# Patient Record
Sex: MALE | Race: WHITE | Employment: UNEMPLOYED | URBAN - METROPOLITAN AREA
[De-identification: names, ages, dates, MRNs, and addresses within clinical notes are randomized per-mention and may not be internally consistent; named-entity substitution may affect disease eponyms.]

---

## 2017-11-24 NOTE — PROGRESS NOTES
SUBJECTIVE:   Tad Beltre is a 22 year old male who presents to clinic today for the following health issues:      Depression and Anxiety Follow-Up    Status since last visit: Worsened     Other associated symptoms:None    Complicating factors:     Significant life event: No     Current substance abuse: Prescription Drugs    Has been off his meds x1 week  Having worsening mood and nerve pain  Would like to restart everything  Remains sober - has had some alcohol since discharge, but nothing else  Working with the [employer], likes his job  Living with mother currently        PHQ-9 Score and MyChart F/U Questions 11/27/2017   Total Score 15   Q9: Suicide Ideation Not at all     PRESTON-7 SCORE 11/27/2017   Total Score 14       PHQ-9  English  PHQ-9   Any Language  GAD7  Suicide Assessment Five-step Evaluation and Treatment (SAFE-T)      Amount of exercise or physical activity: 2-3 days/week for an average of 30-45 minutes    Problems taking medications regularly: No    Medication side effects: none    Diet: regular (no restrictions)        Problem list and histories reviewed & adjusted, as indicated.  Additional history: as documented    Patient Active Problem List   Diagnosis     Opioid type dependence (H)     Cannabis dependence (H)     Non-traumatic rhabdomyolysis     Neuropathy     Moderate single current episode of major depressive disorder (H)     Essential hypertension     Anxiety     Tobacco use disorder     History reviewed. No pertinent surgical history.    Social History   Substance Use Topics     Smoking status: Current Every Day Smoker     Packs/day: 1.00     Smokeless tobacco: Never Used     Alcohol use Yes     Family History   Problem Relation Age of Onset     Anxiety Disorder Mother      Depression Mother      Depression Maternal Grandmother              Reviewed and updated as needed this visit by clinical staffTobacco  Allergies  Meds  Med Hx  Surg Hx  Fam Hx  Soc Hx      Reviewed and  "updated as needed this visit by Provider         ROS:  Constitutional, neuro, CV, musculoskeletal, and psychiatric systems are negative, except as otherwise noted.      OBJECTIVE:                                                    /88  Pulse 88  Temp 98.3  F (36.8  C) (Oral)  Ht 5' 9\" (1.753 m)  Wt 170 lb 6 oz (77.3 kg)  SpO2 100%  BMI 25.16 kg/m2  Body mass index is 25.16 kg/(m^2).  Constitutional: healthy, alert, active, no distress.    Head: Normocephalic  Musculoskeletal: extremities normal- no gross deformities noted, gait normal, normal muscle tone and able to move about the exam room without difficulty.    Skin: no suspicious lesions or rashes appreciated on exposed areas  Neurologic: Gait normal. Moving all extremities spontaneously, no apparent weakness.    Psychiatric: mentation appears normal, thoughts are clear and concise. Converses appropriately. Affect is Blunted/Flat       ASSESSMENT:                                                      1. Neuropathy    2. Non-traumatic rhabdomyolysis    3. Moderate single current episode of major depressive disorder (H)    4. Essential hypertension    5. Anxiety    6. Tobacco use disorder    7. Opioid dependence in remission (H)         PLAN:                                                    Care Everywhere reviewed. Meds renewed, no changes. Reports he was doing well prior to stopping his meds 1 week ago. Follow up in 3 months to discuss discontinuing gabapentin and Robaxin.    Patient Instructions   Follow up with me in 3 months.       The patient was in agreement with the plan today and had no questions or concerns prior to leaving the clinic.     Loree Baires PA-C  [facility]  "

## 2017-11-27 ENCOUNTER — OFFICE VISIT (OUTPATIENT)
Dept: FAMILY MEDICINE | Facility: CLINIC | Age: 22
End: 2017-11-27
Payer: COMMERCIAL

## 2017-11-27 VITALS
SYSTOLIC BLOOD PRESSURE: 135 MMHG | TEMPERATURE: 98.3 F | BODY MASS INDEX: 25.23 KG/M2 | WEIGHT: 170.38 LBS | HEIGHT: 69 IN | OXYGEN SATURATION: 100 % | HEART RATE: 88 BPM | DIASTOLIC BLOOD PRESSURE: 88 MMHG

## 2017-11-27 DIAGNOSIS — G62.9 NEUROPATHY: Primary | ICD-10-CM

## 2017-11-27 DIAGNOSIS — I10 ESSENTIAL HYPERTENSION: ICD-10-CM

## 2017-11-27 DIAGNOSIS — F41.9 ANXIETY: ICD-10-CM

## 2017-11-27 DIAGNOSIS — F17.200 TOBACCO USE DISORDER: ICD-10-CM

## 2017-11-27 DIAGNOSIS — M62.82 NON-TRAUMATIC RHABDOMYOLYSIS: ICD-10-CM

## 2017-11-27 DIAGNOSIS — F11.21 OPIOID DEPENDENCE IN REMISSION (H): ICD-10-CM

## 2017-11-27 DIAGNOSIS — F32.1 MODERATE SINGLE CURRENT EPISODE OF MAJOR DEPRESSIVE DISORDER (H): ICD-10-CM

## 2017-11-27 PROCEDURE — 99204 OFFICE O/P NEW MOD 45 MIN: CPT | Performed by: PHYSICIAN ASSISTANT

## 2017-11-27 RX ORDER — THIAMINE HCL 50 MG
50 TABLET ORAL DAILY
Qty: 30 TABLET | Refills: 2 | Status: SHIPPED | OUTPATIENT
Start: 2017-11-27

## 2017-11-27 RX ORDER — MIRTAZAPINE 7.5 MG/1
7.5 TABLET, FILM COATED ORAL AT BEDTIME
Qty: 30 TABLET | Refills: 2 | Status: SHIPPED | OUTPATIENT
Start: 2017-11-27 | End: 2018-03-28

## 2017-11-27 RX ORDER — METHOCARBAMOL 500 MG/1
500-1000 TABLET, FILM COATED ORAL 3 TIMES DAILY PRN
Qty: 60 TABLET | Refills: 2 | Status: SHIPPED | OUTPATIENT
Start: 2017-11-27

## 2017-11-27 RX ORDER — METOPROLOL TARTRATE 50 MG
75 TABLET ORAL 2 TIMES DAILY
Qty: 90 TABLET | Refills: 2 | Status: SHIPPED | OUTPATIENT
Start: 2017-11-27 | End: 2018-03-28

## 2017-11-27 RX ORDER — DULOXETIN HYDROCHLORIDE 30 MG/1
90 CAPSULE, DELAYED RELEASE ORAL DAILY
Qty: 90 CAPSULE | Refills: 2 | Status: SHIPPED | OUTPATIENT
Start: 2017-11-27 | End: 2018-03-28

## 2017-11-27 RX ORDER — QUETIAPINE FUMARATE 100 MG/1
100 TABLET, FILM COATED ORAL AT BEDTIME
Qty: 30 TABLET | Refills: 2 | Status: SHIPPED | OUTPATIENT
Start: 2017-11-27 | End: 2018-03-28

## 2017-11-27 RX ORDER — HYDROXYZINE HYDROCHLORIDE 25 MG/1
25-50 TABLET, FILM COATED ORAL 2 TIMES DAILY PRN
Qty: 60 TABLET | Refills: 2 | Status: SHIPPED | OUTPATIENT
Start: 2017-11-27

## 2017-11-27 RX ORDER — GABAPENTIN 300 MG/1
900 CAPSULE ORAL 3 TIMES DAILY
Qty: 270 CAPSULE | Refills: 2 | Status: SHIPPED | OUTPATIENT
Start: 2017-11-27 | End: 2018-03-28

## 2017-11-27 RX ORDER — NICOTINE 21 MG/24HR
1 PATCH, TRANSDERMAL 24 HOURS TRANSDERMAL EVERY 24 HOURS
Qty: 30 PATCH | Refills: 0 | Status: SHIPPED | OUTPATIENT
Start: 2017-11-27

## 2017-11-27 ASSESSMENT — ANXIETY QUESTIONNAIRES
6. BECOMING EASILY ANNOYED OR IRRITABLE: MORE THAN HALF THE DAYS
7. FEELING AFRAID AS IF SOMETHING AWFUL MIGHT HAPPEN: MORE THAN HALF THE DAYS
GAD7 TOTAL SCORE: 14
2. NOT BEING ABLE TO STOP OR CONTROL WORRYING: MORE THAN HALF THE DAYS
1. FEELING NERVOUS, ANXIOUS, OR ON EDGE: MORE THAN HALF THE DAYS
3. WORRYING TOO MUCH ABOUT DIFFERENT THINGS: MORE THAN HALF THE DAYS
IF YOU CHECKED OFF ANY PROBLEMS ON THIS QUESTIONNAIRE, HOW DIFFICULT HAVE THESE PROBLEMS MADE IT FOR YOU TO DO YOUR WORK, TAKE CARE OF THINGS AT HOME, OR GET ALONG WITH OTHER PEOPLE: VERY DIFFICULT
5. BEING SO RESTLESS THAT IT IS HARD TO SIT STILL: NEARLY EVERY DAY

## 2017-11-27 ASSESSMENT — PATIENT HEALTH QUESTIONNAIRE - PHQ9
5. POOR APPETITE OR OVEREATING: SEVERAL DAYS
SUM OF ALL RESPONSES TO PHQ QUESTIONS 1-9: 15

## 2017-11-27 NOTE — PROGRESS NOTES
Meds Per Care Everywhere    CURRENT MEDICATIONS:   Current Outpatient Prescriptions   Medication Sig Dispense Refill     acetaminophen (TYLENOL EXTRA STRGTH) 500 mg tablet Take 2 tablets by mouth 4 times daily. Max acetaminophen dose: 4000mg in 24 hrs. 0     Cane Single Point Cane for home use. For 99 days. 1 Device 0     DULoxetine (CYMBALTA) 30 mg Delayed-release capsule Take 3 capsules by mouth once daily for 30 days. 90 capsule 0     gabapentin (NEURONTIN) 300 mg capsule Take 3 capsules by mouth 3 times daily for 30 days. 270 capsule 0     hydrOXYzine pamoate (VISTARIL) 25 mg capsule Take 1 capsule by mouth 2 times daily if needed for anxiety, itching, or pain (use with dilaudid). 30 capsule 0     lidocaine 5% (LIDODERM) 5 % patch Apply 1-3 Patches on dry, clean, hairless skin once daily. Apply patch(es) to painful area of skin for up to 12 hours within a 24-hour period. 0     methocarbamol (ROBAXIN) 500 mg tablet Take 1-2 tablets by mouth 4 times daily for 30 days. 240 tablet 0     metoprolol tartrate (LOPRESSOR) 25 mg tablet Take 3 tablets by mouth 2 times daily for 30 days. 180 tablet 0     mirtazapine (REMERON) 7.5 mg tablet Take 1 tablet by mouth at bedtime for 30 days. 30 tablet 0     multivitamin-folic acid 0.4 mg tablet Take 1 tablet by mouth once daily for 30 days. 30 tablet 0     nicotine (NICORETTE) 2 mg gum Take 1 Each by mouth every hour while awake as needed for Nicotine Craving. 110 Each 0     nicotine 14 mg/24 hr (NICODERM; HABITROL) 14 mg/24 hr patch Apply 1 Patch on dry, clean, hairless skin once daily 28 Patch 0     QUEtiapine (SEROQUEL) 100 mg tablet Take 1 tablet by mouth at bedtime. 30 tablet 0     thiamine (VITAMIN B1) 100 mg tablet Take 1 tablet by mouth once daily for 30 days. 30 tablet 0

## 2017-11-27 NOTE — MR AVS SNAPSHOT
"              After Visit Summary   2017    Tad Beltre    MRN: [id number]           Patient Information     Date Of Birth          1995        Visit Information        Provider Department      2017 5:20 PM Loree Baires PA-C [facility] [place] Instructions    Follow up with me in 3 months.          Follow-ups after your visit        Who to contact     Normal or non-critical lab and imaging results will be communicated to you by LINYWORKShart, letter or phone within 4 business days after the clinic has received the results. If you do not hear from us within 7 days, please contact the clinic through MyChart or phone. If you have a critical or abnormal lab result, we will notify you by phone as soon as possible.  Submit refill requests through [email or web address] or call your pharmacy and they will forward the refill request to us. Please allow 3 business days for your refill to be completed.          If you need to speak with a  for additional information , please call: [phone]             Additional Information About Your Visit        [email or web address] Information     [email or web address] lets you send messages to your doctor, view your test results, renew your prescriptions, schedule appointments and more. To sign up, go to [email or web address] . Click on \"Log in\" on the left side of the screen, which will take you to the Welcome page. Then click on \"Sign up Now\" on the right side of the page.     You will be asked to enter the access code listed below, as well as some personal information. Please follow the directions to create your username and password.     Your access code is: [email or web address]  Expires: 2018  5:51 PM     Your access code will  in 90 days. If you need help or a new code, please call your [place] clinic or [phone].        Care EveryWhere ID     This is your Care EveryWhere ID. This could be used by other organizations to access your [place] medical " "records  [id number]        Your Vitals Were     Pulse Temperature Height Pulse Oximetry BMI (Body Mass Index)       88 98.3  F (36.8  C) (Oral) 5' 9\" (1.753 m) 100% 25.16 kg/m2        Blood Pressure from Last 3 Encounters:   11/27/17 135/88   10/31/12 136/80    Weight from Last 3 Encounters:   11/27/17 170 lb 6 oz (77.3 kg)   10/31/12 147 lb (66.7 kg) (54 %)*     * Growth percentiles are based on [facility] 2-20 Years data.              Today, you had the following     No orders found for display       Primary Care Provider Office Phone # Fax #    Jonathan Mathew [phone]       [facility] [address] [address]        Equal Access to Services     [facility]: Hadii aad ku hadasho Soomaali, waaxda luqadaha, qaybta kaalmada adeegyada, liliana cheemain hayaan marcie franco . So [facility] [phone].    ATENCIÓN: Si habla español, tiene a sheriff disposición servicios gratuitos de asistencia lingüística. Llame al [phone].    We comply with applicable federal civil rights laws and [place] laws. We do not discriminate on the basis of race, color, national origin, age, disability, sex, sexual orientation, or gender identity.            Thank you!     Thank you for choosing [facility]  for your care. Our goal is always to provide you with excellent care. Hearing back from our patients is one way we can continue to improve our services. Please take a few minutes to complete the written survey that you may receive in the mail after your visit with us. Thank you!             Your Updated Medication List - Protect others around you: Learn how to safely use, store and throw away your medicines at www.disposemymeds.org.          This list is accurate as of: 11/27/17  5:51 PM.  Always use your most recent med list.                   Brand Name Dispense Instructions for use Diagnosis    melatonin 3 MG tablet     30 tablet    Take 1 tablet by mouth nightly as " needed for sleep.    Persistent disorder of initiating or maintaining sleep       SKIN TEST READING TUBERCULIN      every 24 hours.    Routine health maintenance

## 2017-11-27 NOTE — NURSING NOTE
"Chief Complaint   Patient presents with     Recheck Medication       Initial /88  Pulse 88  Temp 98.3  F (36.8  C) (Oral)  Ht 5' 9\" (1.753 m)  Wt 170 lb 6 oz (77.3 kg)  SpO2 100%  BMI 25.16 kg/m2 Estimated body mass index is 25.16 kg/(m^2) as calculated from the following:    Height as of this encounter: 5' 9\" (1.753 m).    Weight as of this encounter: 170 lb 6 oz (77.3 kg).  Medication Reconciliation: complete   Digna Zepeda MA      "

## 2017-11-28 ASSESSMENT — ANXIETY QUESTIONNAIRES: GAD7 TOTAL SCORE: 14

## 2018-03-28 ENCOUNTER — OFFICE VISIT (OUTPATIENT)
Dept: FAMILY MEDICINE | Facility: CLINIC | Age: 23
End: 2018-03-28
Payer: COMMERCIAL

## 2018-03-28 VITALS
BODY MASS INDEX: 31.43 KG/M2 | SYSTOLIC BLOOD PRESSURE: 135 MMHG | OXYGEN SATURATION: 98 % | HEART RATE: 84 BPM | TEMPERATURE: 98.6 F | WEIGHT: 212.8 LBS | DIASTOLIC BLOOD PRESSURE: 85 MMHG

## 2018-03-28 DIAGNOSIS — I10 ESSENTIAL HYPERTENSION: ICD-10-CM

## 2018-03-28 DIAGNOSIS — G62.9 NEUROPATHY: ICD-10-CM

## 2018-03-28 DIAGNOSIS — M62.82 NON-TRAUMATIC RHABDOMYOLYSIS: ICD-10-CM

## 2018-03-28 DIAGNOSIS — F32.1 MODERATE SINGLE CURRENT EPISODE OF MAJOR DEPRESSIVE DISORDER (H): ICD-10-CM

## 2018-03-28 PROCEDURE — 99214 OFFICE O/P EST MOD 30 MIN: CPT | Performed by: PHYSICIAN ASSISTANT

## 2018-03-28 RX ORDER — MIRTAZAPINE 7.5 MG/1
7.5 TABLET, FILM COATED ORAL AT BEDTIME
Qty: 90 TABLET | Refills: 3 | Status: SHIPPED | OUTPATIENT
Start: 2018-03-28

## 2018-03-28 RX ORDER — QUETIAPINE FUMARATE 100 MG/1
100 TABLET, FILM COATED ORAL AT BEDTIME
Qty: 90 TABLET | Refills: 3 | Status: SHIPPED | OUTPATIENT
Start: 2018-03-28

## 2018-03-28 RX ORDER — GABAPENTIN 300 MG/1
900 CAPSULE ORAL 3 TIMES DAILY
Qty: 810 CAPSULE | Refills: 3 | Status: SHIPPED | OUTPATIENT
Start: 2018-03-28

## 2018-03-28 RX ORDER — DULOXETIN HYDROCHLORIDE 30 MG/1
90 CAPSULE, DELAYED RELEASE ORAL DAILY
Qty: 270 CAPSULE | Refills: 3 | Status: SHIPPED | OUTPATIENT
Start: 2018-03-28

## 2018-03-28 RX ORDER — METOPROLOL TARTRATE 50 MG
75 TABLET ORAL 2 TIMES DAILY
Qty: 270 TABLET | Refills: 3 | Status: SHIPPED | OUTPATIENT
Start: 2018-03-28

## 2018-03-28 ASSESSMENT — ANXIETY QUESTIONNAIRES
IF YOU CHECKED OFF ANY PROBLEMS ON THIS QUESTIONNAIRE, HOW DIFFICULT HAVE THESE PROBLEMS MADE IT FOR YOU TO DO YOUR WORK, TAKE CARE OF THINGS AT HOME, OR GET ALONG WITH OTHER PEOPLE: NOT DIFFICULT AT ALL
GAD7 TOTAL SCORE: 3
2. NOT BEING ABLE TO STOP OR CONTROL WORRYING: NOT AT ALL
1. FEELING NERVOUS, ANXIOUS, OR ON EDGE: SEVERAL DAYS
7. FEELING AFRAID AS IF SOMETHING AWFUL MIGHT HAPPEN: NOT AT ALL
5. BEING SO RESTLESS THAT IT IS HARD TO SIT STILL: NOT AT ALL
6. BECOMING EASILY ANNOYED OR IRRITABLE: SEVERAL DAYS
3. WORRYING TOO MUCH ABOUT DIFFERENT THINGS: SEVERAL DAYS

## 2018-03-28 ASSESSMENT — PATIENT HEALTH QUESTIONNAIRE - PHQ9: 5. POOR APPETITE OR OVEREATING: NOT AT ALL

## 2018-03-28 NOTE — LETTER
My Depression Action Plan  Name: Tad Beltre   Date of Birth 1995  Date: 3/28/2018    My doctor: Loree Baires   My clinic: [facility]  [address]  [place] [id number] [phone]          GREEN    ZONE   Good Control    What it looks like:     Things are going generally well. You have normal up s and down s. You may even feel depressed from time to time, but bad moods usually last less than a day.   What you need to do:  1. Continue to care for yourself (see self care plan)  2. Check your depression survival kit and update it as needed  3. Follow your physician s recommendations including any medication.  4. Do not stop taking medication unless you consult with your physician first.           YELLOW         ZONE Getting Worse    What it looks like:     Depression is starting to interfere with your life.     It may be hard to get out of bed; you may be starting to isolate yourself from others.    Symptoms of depression are starting to last most all day and this has happened for several days.     You may have suicidal thoughts but they are not constant.   What you need to do:     1. Call your care team, your response to treatment will improve if you keep your care team informed of your progress. Yellow periods are signs an adjustment may need to be made.     2. Continue your self-care, even if you have to fake it!    3. Talk to someone in your support network    4. Open up your depression survival kit           RED    ZONE Medical Alert - Get Help    What it looks like:     Depression is seriously interfering with your life.     You may experience these or other symptoms: You can t get out of bed most days, can t work or engage in other necessary activities, you have trouble taking care of basic hygiene, or basic responsibilities, thoughts of suicide or death that will not go away, self-injurious behavior.     What you need to do:  1. Call your care  team and request a same-day appointment. If they are not available (weekends or after hours) call your local crisis line, emergency room or 911.            Depression Self Care Plan / Survival Kit    Self-Care for Depression  Here s the deal. Your body and mind are really not as separate as most people think.  What you do and think affects how you feel and how you feel influences what you do and think. This means if you do things that people who feel good do, it will help you feel better.  Sometimes this is all it takes.  There is also a place for medication and therapy depending on how severe your depression is, so be sure to consult with your medical provider and/ or Behavioral Health Consultant if your symptoms are worsening or not improving.     In order to better manage my stress, I will:    Exercise  Get some form of exercise, every day. This will help reduce pain and release endorphins, the  feel good  chemicals in your brain. This is almost as good as taking antidepressants!  This is not the same as joining a gym and then never going! (they count on that by the way ) It can be as simple as just going for a walk or doing some gardening, anything that will get you moving.      Hygiene   Maintain good hygiene (Get out of bed in the morning, Make your bed, Brush your teeth, Take a shower, and Get dressed like you were going to work, even if you are unemployed).  If your clothes don't fit try to get ones that do.    Diet  I will strive to eat foods that are good for me, drink plenty of water, and avoid excessive sugar, caffeine, alcohol, and other mood-altering substances.  Some foods that are helpful in depression are: complex carbohydrates, B vitamins, flaxseed, fish or fish oil, fresh fruits and vegetables.    Psychotherapy  I agree to participate in Individual Therapy (if recommended).    Medication  If prescribed medications, I agree to take them.  Missing doses can result in serious side effects.  I  understand that drinking alcohol, or other illicit drug use, may cause potential side effects.  I will not stop my medication abruptly without first discussing it with my provider.    Staying Connected With Others  I will stay in touch with my friends, family members, and my primary care provider/team.    Use your imagination  Be creative.  We all have a creative side; it doesn t matter if it s oil painting, sand castles, or mud pies! This will also kick up the endorphins.    Witness Beauty  (AKA stop and smell the roses) Take a look outside, even in mid-winter. Notice colors, textures. Watch the squirrels and birds.     Service to others  Be of service to others.  There is always someone else in need.  By helping others we can  get out of ourselves  and remember the really important things.  This also provides opportunities for practicing all the other parts of the program.    Humor  Laugh and be silly!  Adjust your TV habits for less news and crime-drama and more comedy.    Control your stress  Try breathing deep, massage therapy, biofeedback, and meditation. Find time to relax each day.     My support system    Clinic Contact:  Phone number:    Contact 1:  Phone number:    Contact 2:  Phone number:    Jainism/:  Phone number:    Therapist:  Phone number:    Local crisis center:    Phone number:    Other community support:  Phone number:

## 2018-03-28 NOTE — PROGRESS NOTES
SUBJECTIVE:   Tad Beltre is a 22 year old male who presents to clinic today for the following health issues:      Depression and Anxiety Follow-Up    Status since last visit: Improved     Other associated symptoms:None    Complicating factors:     Significant life event: No     Current substance abuse: None        Last visit, Nov 2017:  Has been off his meds x1 week  Having worsening mood and nerve pain  Would like to restart everything  Remains sober - has had some alcohol since discharge, but nothing else  Working with the [employer], likes his job  Living with mother currently      PHQ-9 11/27/2017   Total Score 15   Q9: Suicide Ideation Not at all     PRESTON-7 SCORE 11/27/2017   Total Score 14       PHQ-9  English  PHQ-9   Any Language  PRESTON-7  Suicide Assessment Five-step Evaluation and Treatment (SAFE-T)    Amount of exercise or physical activity: will start soon    Problems taking medications regularly: No    Medication side effects: none    Diet: regular (no restrictions)        PROBLEMS TO ADD ON...  Needs refills    Problem list and histories reviewed & adjusted, as indicated.  Additional history: as documented    Patient Active Problem List   Diagnosis     Opioid type dependence (H)     Cannabis dependence (H)     Non-traumatic rhabdomyolysis     Neuropathy     Moderate single current episode of major depressive disorder (H)     Essential hypertension     Anxiety     Tobacco use disorder     No past surgical history on file.    Social History   Substance Use Topics     Smoking status: Current Every Day Smoker     Packs/day: 1.00     Smokeless tobacco: Never Used     Alcohol use Yes     Family History   Problem Relation Age of Onset     Anxiety Disorder Mother      Depression Mother      Depression Maternal Grandmother            Reviewed and updated as needed this visit by clinical staff  Tobacco  Allergies  Meds       Reviewed and updated as needed this visit by Provider          ROS:  Constitutional, neuro, and psychiatric systems are negative, except as otherwise noted.    OBJECTIVE:                                                    /85  Pulse 84  Temp 98.6  F (37  C) (Tympanic)  Wt 212 lb 12.8 oz (96.5 kg)  SpO2 98%  BMI 31.43 kg/m2  Body mass index is 31.43 kg/(m^2).  Constitutional: healthy, alert, active, no distress.    Head: Normocephalic   Musculoskeletal: extremities normal- no gross deformities noted, gait normal, normal muscle tone and able to move about the exam room without difficulty.    Skin: no suspicious lesions or rashes appreciated on exposed areas  Neurologic: Gait normal. Moving all extremities spontaneously, no apparent weakness.    Psychiatric: mentation appears normal, thoughts are clear and concise. Converses appropriately. Affect is Appropriate/mood-congruent       ASSESSMENT:                                                      1. Essential hypertension    2. Moderate single current episode of major depressive disorder (H)    3. Neuropathy    4. Non-traumatic rhabdomyolysis         PLAN:                                                    Patient is doing well. He doesn't feel the need to make any changes to his med regimen today. He is currently going to treatment for drug abuse. Three months sober. Meds renewed, no changes. Follow up annually.    The patient was in agreement with the plan today and had no questions or concerns prior to leaving the clinic.     Loree Baires PA-C  [facility]

## 2018-03-28 NOTE — MR AVS SNAPSHOT
After Visit Summary   3/28/2018    Tad Beltre    MRN: [id number]           Patient Information     Date Of Birth          1995        Visit Information        Provider Department      3/28/2018 12:20 PM Loree Baires PA-C [facility]        Today's Diagnoses     Essential hypertension        Moderate single current episode of major depressive disorder (H)        Neuropathy        Non-traumatic rhabdomyolysis           Follow-ups after your visit        Follow-up notes from your care team     Return in about 1 year (around 3/28/2019) for a med check.      Your next 10 appointments already scheduled     Mar 28, 2018 12:20 PM CDT   Office Visit with Loree Baires PA-C   [facility] [place] [facility]    [address] [facility] [address] [phone]           Bring a current list of meds and any records pertaining to this visit. For Physicals, please bring immunization records and any forms needing to be filled out. Please arrive 10 minutes early to complete paperwork.              Who to contact     Normal or non-critical lab and imaging results will be communicated to you by [employer] Playbook[employer], letter or phone within 4 business days after the clinic has received the results. If you do not hear from us within 7 days, please contact the clinic through Sp[employer] or phone. If you have a critical or abnormal lab result, we will notify you by phone as soon as possible.  Submit refill requests through [employer] or call your pharmacy and they will forward the refill request to us. Please allow 3 business days for your refill to be completed.          If you need to speak with a  for additional information , please call: [phone]             Additional Information About Your Visit        [employer] Information     [employer] lets you send messages to your doctor, view your test results, renew your prescriptions, schedule appointments  "and more. To sign up, go to [email or web address] . Click on \"Log in\" on the left side of the screen, which will take you to the Welcome page. Then click on \"Sign up Now\" on the right side of the page.     You will be asked to enter the access code listed below, as well as some personal information. Please follow the directions to create your username and password.     Your access code is: [email or web address]  Expires: 2018  6:56 PM     Your access code will  in 90 days. If you need help or a new code, please call your [facility] clinic or [phone].        Care EveryWhere ID     This is your Care EveryWhere ID. This could be used by other organizations to access your [facility] medical records  [id number]        Your Vitals Were     Pulse Temperature Pulse Oximetry BMI (Body Mass Index)          84 98.6  F (37  C) (Tympanic) 98% 31.43 kg/m2         Blood Pressure from Last 3 Encounters:   18 135/85   17 135/88   10/31/12 136/80    Weight from Last 3 Encounters:   18 212 lb 12.8 oz (96.5 kg)   17 170 lb 6 oz (77.3 kg)   10/31/12 147 lb (66.7 kg) (54 %)*     * Growth percentiles are based on [facility] 2-20 Years data.              We Performed the Following     DEPRESSION ACTION PLAN (DAP)          Where to get your medicines      These medications were sent to [facility] - [address]  [address] [facility] [address] [address]     Phone:  [phone]     DULoxetine 30 MG EC capsule    gabapentin 300 MG capsule    metoprolol tartrate 50 MG tablet    mirtazapine 7.5 MG Tabs tablet    QUEtiapine 100 MG tablet          Primary Care Provider Office Phone # Fax #    Loree Baires PA-C [phone] [address]  [place] [address]        Equal Access to Services     [facility] SANTOS : Lissette Amin, waaxda luqadamontez lofton waxay idiin hayaan adeeg kharash la'aan ah. So wa " [phone].    ATENCIÓN: Si richar whitaker, tiene a sheriff disposición servicios gratuitos de asistencia lingüística. Murtaza zhong [phone].    We comply with applicable federal civil rights laws and [place] laws. We do not discriminate on the basis of race, color, national origin, age, disability, sex, sexual orientation, or gender identity.            Thank you!     Thank you for choosing [facility]  for your care. Our goal is always to provide you with excellent care. Hearing back from our patients is one way we can continue to improve our services. Please take a few minutes to complete the written survey that you may receive in the mail after your visit with us. Thank you!             Your Updated Medication List - Protect others around you: Learn how to safely use, store and throw away your medicines at www.disposemymeds.org.          This list is accurate as of 3/28/18 12:11 PM.  Always use your most recent med list.                   Brand Name Dispense Instructions for use Diagnosis    DULoxetine 30 MG EC capsule    CYMBALTA    270 capsule    Take 3 capsules (90 mg) by mouth daily    Moderate single current episode of major depressive disorder (H)       gabapentin 300 MG capsule    NEURONTIN    810 capsule    Take 3 capsules (900 mg) by mouth 3 times daily    Neuropathy, Non-traumatic rhabdomyolysis       hydrOXYzine 25 MG tablet    ATARAX    60 tablet    Take 1-2 tablets (25-50 mg) by mouth 2 times daily as needed for itching or anxiety    Anxiety       melatonin 3 MG tablet     30 tablet    Take 1 tablet by mouth nightly as needed for sleep.    Persistent disorder of initiating or maintaining sleep       methocarbamol 500 MG tablet    ROBAXIN    60 tablet    Take 1-2 tablets (500-1,000 mg) by mouth 3 times daily as needed for muscle spasms    Neuropathy, Non-traumatic rhabdomyolysis       metoprolol tartrate 50 MG tablet    LOPRESSOR    270 tablet    Take 1.5 tablets (75 mg) by mouth 2 times  daily    Essential hypertension       mirtazapine 7.5 MG Tabs tablet    REMERON    90 tablet    Take 1 tablet (7.5 mg) by mouth At Bedtime    Moderate single current episode of major depressive disorder (H)       nicotine 21 MG/24HR 24 hr patch    NICODERM CQ    30 patch    Place 1 patch onto the skin every 24 hours    Tobacco use disorder       QUEtiapine 100 MG tablet    SEROquel    90 tablet    Take 1 tablet (100 mg) by mouth At Bedtime    Moderate single current episode of major depressive disorder (H)       SKIN TEST READING TUBERCULIN      every 24 hours.    Routine health maintenance       vitamin  B-1 50 MG tablet     30 tablet    Take 1 tablet (50 mg) by mouth daily    Neuropathy, Non-traumatic rhabdomyolysis

## 2018-03-29 ASSESSMENT — PATIENT HEALTH QUESTIONNAIRE - PHQ9: SUM OF ALL RESPONSES TO PHQ QUESTIONS 1-9: 0

## 2018-03-29 ASSESSMENT — ANXIETY QUESTIONNAIRES: GAD7 TOTAL SCORE: 3

## 2018-04-17 ENCOUNTER — TELEPHONE (OUTPATIENT)
Dept: FAMILY MEDICINE | Facility: CLINIC | Age: 23
End: 2018-04-17

## 2018-04-17 NOTE — TELEPHONE ENCOUNTER
Dr. Debbie Painter from [facility] calling to speak with Loree regarding patient. Dr. Painter has been seeing patient for addiction medicine for about 3 weeks now. Patient seems to be excessive sedated in office. Constantly falling asleep during conversation. Dr. Painter asking if Loree has seen this behavior. Please Call Dr. Debbie Painter at [phone].  Also requesting current med list to be faxed to [phone]. Will be faxing over VALE

## 2018-05-15 ENCOUNTER — TELEPHONE (OUTPATIENT)
Dept: FAMILY MEDICINE | Facility: CLINIC | Age: 23
End: 2018-05-15

## 2018-05-15 ASSESSMENT — ANXIETY QUESTIONNAIRES

## 2018-05-15 ASSESSMENT — PATIENT HEALTH QUESTIONNAIRE - PHQ9: 5. POOR APPETITE OR OVEREATING: NOT AT ALL

## 2018-05-15 NOTE — TELEPHONE ENCOUNTER
Panel Management Review      Patient has the following on his problem list:     Depression / Dysthymia review    Measure:  Needs PHQ-9 score of 4 or less during index window.  Administer PHQ-9 and if score is 5 or more, send encounter to provider for next steps.    5   7 month window range: .    PHQ-9 SCORE 11/27/2017 3/28/2018 5/15/2018   Total Score 15 0 4       If PHQ-9 recheck is 5 or more, route to provider for next steps.    Patient is due for:  PHQ9    Hypertension   Last three blood pressure readings:  BP Readings from Last 3 Encounters:   03/28/18 135/85   11/27/17 135/88   10/31/12 136/80     Blood pressure: Passed    HTN Guidelines:  Age 18-59 BP range:  Less than 140/90  Age 60-85 with Diabetes:  Less than 140/90  Age 60-85 without Diabetes:  less than 150/90      Composite cancer screening  Chart review shows that this patient is due/due soon for the following None  Summary:    Patient is due/failing the following:   PHQ9    Action needed:   Patient needs to do PHQ9.    Type of outreach:    Phone, spoke to patient.  PHQ9 done    Questions for provider review:    None            PHQ-9 SCORE 11/27/2017 3/28/2018 5/15/2018   Total Score 15 0 4                                                                                                                               Viji Aguirre MA     Chart routed to Care Team .

## 2018-05-16 ASSESSMENT — ANXIETY QUESTIONNAIRES: GAD7 TOTAL SCORE: 0

## 2018-05-16 ASSESSMENT — PATIENT HEALTH QUESTIONNAIRE - PHQ9: SUM OF ALL RESPONSES TO PHQ QUESTIONS 1-9: 4

## 2019-04-13 ENCOUNTER — THERAPY VISIT (OUTPATIENT)
Dept: PHYSICAL THERAPY | Facility: CLINIC | Age: 24
End: 2019-04-13
Payer: COMMERCIAL

## 2019-04-13 DIAGNOSIS — M21.372 LEFT FOOT DROP: ICD-10-CM

## 2019-04-13 DIAGNOSIS — M79.662 PAIN OF LEFT LOWER LEG: ICD-10-CM

## 2019-04-13 PROCEDURE — 97162 PT EVAL MOD COMPLEX 30 MIN: CPT | Mod: GP | Performed by: PHYSICAL THERAPIST

## 2019-04-13 PROCEDURE — 97110 THERAPEUTIC EXERCISES: CPT | Mod: GP | Performed by: PHYSICAL THERAPIST

## 2019-04-13 NOTE — PROGRESS NOTES
[facility] Initial Evaluation  Subjective:  The history is provided by the patient. No  was used.   Affected Side: left lower leg.  Condition occurred with:  Other reason.  Condition occurred: other.  This is a chronic condition  Patient has a hx of heroine use and in Sept 2017 he states he had an overdose when he was in his vehicle and slept slumped over for 5 hours. When he woke up he had complete leg numbness from his hip down. He waited a few days to go into the hospital, but when he went in they said he had rhabdomyolysis from lack of blood flow to his leg, and kidney failure from that. He spent time in the hospital to recover. He states he had an AFO for about 8 months due to the foot drop. His addiction is currently in remission (managing this with a doctor) and his primary complaint is now left lower leg pain..    Patient reports pain:  Lower leg.  Radiates to:  Foot and thigh.  Pain is described as aching, sharp, cramping and shooting and is constant and reported as 7/10.  Associated symptoms:  Catching, locking, loss of motion/stiffness, loss of strength, numbness and tingling. Pain is the same all the time.  Symptoms are exacerbated by nothing and relieved by activity/movement (wore AFO for about 8 months).  Since onset symptoms are gradually improving.  Special tests:  MRI.  Previous treatment includes physical therapy (in hospital ).  There was moderate improvement following previous treatment.  General health as reported by patient is good.  Pertinent medical history includes:  Depression and smoking (hx of chemical dependancy (heroine)).  Medical allergies: no.  Other surgeries include:  None reported.  Current medications:  Anti-depressants, muscle relaxants, pain medication and sleep medication.  Current occupation is ; Lives w/ girlfriend and their son, and his grandma; 1 flight of stairs to get into home; Hobbies include working out, and  snow boarding, skateboarding, and previously hockey.  Patient is working in normal job without restrictions.  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    Barriers include:  None as reported by the patient.    Red flags:  None as reported by the patient.                        Objective:    Gait:  Mild foot drop; no AFO today  Gait Type:  Antalgic               Ankle/Foot Evaluation  ROM:    AROM:    Dorsiflexion:  Left:   Lacking 10  Right:   20  Plantarflexion:  Left:  60    Right:  50  Inversion:  Left:  45     Right:  45  Eversion:  12     Right:  30      PROM:    Dorsiflexion: Left:    5     Right:                  Strength:    Dorsiflexion:  Left: 2/5     Pain:   Right: 5/5   Pain:    Inversion:Left: 5/5  Pain:     Right: 5/5  Pain:  Eversion:Left: 5/5  Pain:  Right: 5/5  Pain:              Strength wnl ankle: Hip flex, abd, ext all 5/5 B; knee flex and ext 5/5 B; gastroc/soleus MMT: L 2/5 (partial range) , R 5/5 (20/20 reps)                                                              General     ROS    Assessment/Plan:    Patient is a 23 year old male with left side ankle/lower leg complaints.    Patient has the following significant findings with corresponding treatment plan.                Diagnosis 1:  L Lower leg pain and foot drop  Pain -  hot/cold therapy, electric stimulation, manual therapy, splint/taping/bracing/orthotics, self management, education and home program  Decreased ROM/flexibility - manual therapy, therapeutic exercise, therapeutic activity and home program  Decreased strength - therapeutic exercise, therapeutic activities and home program  Impaired gait - gait training and home program  Impaired muscle performance - neuro re-education and home program  Decreased function - therapeutic activities and home program    Therapy Evaluation Codes:   1) History comprised of:   Personal factors that impact the plan of care:      Coping style, Overall behavior pattern, Past/current  experiences and Time since onset of symptoms.    Comorbidity factors that impact the plan of care are:      Chemical Dependency, Depression and Smoking.     Medications impacting care: Anti-depressant, Muscle relaxant, Pain and Sleep.  2) Examination of Body Systems comprised of:   Body structures and functions that impact the plan of care:      Ankle and Knee.   Activity limitations that impact the plan of care are:      Jumping, Running, Standing, Walking and Working.  3) Clinical presentation characteristics are:   Evolving/Changing.  4) Decision-Making    Moderate complexity using standardized patient assessment instrument and/or measureable assessment of functional outcome.  Cumulative Therapy Evaluation is: Moderate complexity.    Previous and current functional limitations:  (See Goal Flow Sheet for this information)    Short term and Long term goals: (See Goal Flow Sheet for this information)     Communication ability:  Patient appears to be able to clearly communicate and understand verbal and written communication and follow directions correctly.  Treatment Explanation - The following has been discussed with the patient:   RX ordered/plan of care  Anticipated outcomes  Possible risks and side effects  This patient would benefit from PT intervention to resume normal activities.   Rehab potential is good.    Frequency:  1 X week, once daily  Duration:  for 4-6 weeks  Discharge Plan:  Achieve all LTG.  Independent in home treatment program.  Reach maximal therapeutic benefit.    Please refer to the daily flowsheet for treatment today, total treatment time and time spent performing 1:1 timed codes.

## 2019-04-13 NOTE — LETTER
[facility] [place] [facility]  [address]  [address]  [place] [address]  [phone]    April 15, 2019    Re: Tad Beltre   :   1995  MRN:  [id number]   REFERRING PHYSICIAN:   Calos Latham    [facility] [place] [facility]    Date of Initial Evaluation: 19  Visits:  Rxs Used: 1  Reason for Referral:     Pain of left lower leg  Left foot drop    EVALUATION SUMMARY    [facility] Initial Evaluation  Subjective:  The history is provided by the patient. No  was used.   Affected Side: left lower leg.  Condition occurred with:  Other reason.  Condition occurred: other.  This is a chronic condition  Patient has a hx of heroine use and in 2017 he states he had an overdose when he was in his vehicle and slept slumped over for 5 hours. When he woke up he had complete leg numbness from his hip down. He waited a few days to go into the hospital, but when he went in they said he had rhabdomyolysis from lack of blood flow to his leg, and kidney failure from that. He spent time in the hospital to recover. He states he had an AFO for about 8 months due to the foot drop. His addiction is currently in remission (managing this with a doctor) and his primary complaint is now left lower leg pain..    Patient reports pain:  Lower leg.  Radiates to:  Foot and thigh.  Pain is described as aching, sharp, cramping and shooting and is constant and reported as 7/10.  Associated symptoms:  Catching, locking, loss of motion/stiffness, loss of strength, numbness and tingling. Pain is the same all the time.  Symptoms are exacerbated by nothing and relieved by activity/movement (wore AFO for about 8 months).  Since onset symptoms are gradually improving.  Special tests:  MRI.  Previous treatment includes physical therapy (in hospital ).  There was moderate improvement following previous treatment.  General health as reported by  patient is good.  Pertinent medical history includes:  Depression and smoking (hx of chemical dependancy (heroine)).  Medical allergies: no.  Other surgeries include:  None reported.  Current medications:  Anti-depressants, muscle relaxants, pain medication and sleep medication.  Current occupation is ; Lives w/ girlfriend and their son, and his grandma; 1 flight of stairs to get into home; Hobbies include working out, and snow boarding, skateboarding, and previously hockey.  Patient is working in normal job without restrictions.  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    Barriers include:  None as reported by the patient.  Red flags:  None as reported by the patient.      Tad Beltre   :   1995                    Objective:    Gait:  Mild foot drop; no AFO today  Gait Type:  Antalgic     Ankle/Foot Evaluation  ROM:    AROM:    Dorsiflexion:  Left:   Lacking 10  Right:   20  Plantarflexion:  Left:  60    Right:  50  Inversion:  Left:  45     Right:  45  Eversion:  12     Right:  30  PROM:    Dorsiflexion: Left:    5     Right:      Strength:    Dorsiflexion:  Left: 2/5     Pain:   Right: 5/5   Pain:  Inversion:Left: 5/5  Pain:     Right: 5/5  Pain:  Eversion:Left: 5/5  Pain:  Right: 5/5  Pain:    Strength wnl ankle: Hip flex, abd, ext all 5/5 B; knee flex and ext 5/5 B; gastroc/soleus MMT: L 2/5 (partial range) , R 5/5 (20/20 reps)    Assessment/Plan:    Patient is a 23 year old male with left side ankle/lower leg complaints.    Patient has the following significant findings with corresponding treatment plan.                Diagnosis 1:  L Lower leg pain and foot drop  Pain -  hot/cold therapy, electric stimulation, manual therapy, splint/taping/bracing/orthotics, self management, education and home program  Decreased ROM/flexibility - manual therapy, therapeutic exercise, therapeutic activity and home program  Decreased strength - therapeutic exercise, therapeutic  activities and home program  Impaired gait - gait training and home program  Impaired muscle performance - neuro re-education and home program  Decreased function - therapeutic activities and home program    Previous and current functional limitations:  (See Goal Flow Sheet for this information)    Short term and Long term goals: (See Goal Flow Sheet for this information)     Communication ability:  Patient appears to be able to clearly communicate and understand verbal and written communication and follow directions correctly.  Treatment Explanation - The following has been discussed with the patient:   RX ordered/plan of care  Anticipated outcomes  Possible risks and side effects  This patient would benefit from PT intervention to resume normal activities.   Rehab potential is good.    Frequency:  1 X week, once daily  Duration:  for 4-6 weeks  Discharge Plan:  Achieve all LTG.  Tad Beltre   :   1995      Independent in home treatment program.  Reach maximal therapeutic benefit.            Thank you for your referral.    INQUIRIES  Therapist: Amanda Hilligoss, PT  [facility] [place] [facility]  [address]  [place] [address]  Phone: [phone]  Fax: [phone]

## 2019-04-20 ENCOUNTER — THERAPY VISIT (OUTPATIENT)
Dept: PHYSICAL THERAPY | Facility: CLINIC | Age: 24
End: 2019-04-20
Payer: COMMERCIAL

## 2019-04-20 DIAGNOSIS — M21.372 LEFT FOOT DROP: ICD-10-CM

## 2019-04-20 DIAGNOSIS — M79.662 PAIN OF LEFT LOWER LEG: ICD-10-CM

## 2019-04-20 PROCEDURE — 97112 NEUROMUSCULAR REEDUCATION: CPT | Mod: GP | Performed by: PHYSICAL THERAPIST

## 2019-04-20 PROCEDURE — 97110 THERAPEUTIC EXERCISES: CPT | Mod: GP | Performed by: PHYSICAL THERAPIST

## 2019-04-20 NOTE — LETTER
[facility] [place] [facility]  [address]  [address]  [place] [address] [phone]    2019    Re: Tad Beltre   :   1995  MRN:  [id number]   REFERRING PHYSICIAN:   Calos Latham    [facility] [place] [facility]    Date of Initial Evaluation: 19  Visits:  Rxs Used: 2  Reason for Referral:     Pain of left lower leg  Left foot drop    DISCHARGE REPORT    Progress reporting period is from 2019 to 2019.   Patient has not returned to therapy so current subjective and objective findings are unknown.  The subjective and objective information are from the last visit (2019) with this patient.    SUBJECTIVE  Subjective: Patient stated he has pain in L leg (all over; more tingling). pt stated he is doing his HEP.   Current Pain level: 6/10   Initial Pain level: 7/10   Changes in function: No changes noted in function since last SOAP note    ;   ,       OBJECTIVE  Objective: Proprioception:  fair control SLS with EO; Immediate LOB with EC in SLS on L; lots of cueing to stay on task;  added knee bends and proprioception to POC      ASSESSMENT/PLAN  Updated problem list and treatment plan: home program  STG/LTGs have been met or progress has been made towards goals:  N/A  Assessment of Progress: The patient has not returned to therapy. Current status is unknown.  Self Management Plans:  Patient has been instructed in a home treatment program.  Tad continues to require the following intervention to meet STG and LTG's: PT intervention is no longer required to meet STG/LTG.    Recommendations:  Pt last seen in PT 2019.  He has since no showed and not responded to phone message.  Discharge to [facility].    Thank you for your referral.    INQUIRIES  Therapist: Asha Talavera, PT   [facility] [place] [facility]  [address]  [address]  [place] [address]  Phone: [phone]  Fax: [phone]

## 2019-06-19 PROBLEM — M79.662 PAIN OF LEFT LOWER LEG: Status: RESOLVED | Noted: 2019-04-13 | Resolved: 2019-06-19

## 2019-06-19 PROBLEM — M21.372 LEFT FOOT DROP: Status: RESOLVED | Noted: 2019-04-13 | Resolved: 2019-06-19

## 2019-06-19 NOTE — PROGRESS NOTES
Subjective:  HPI                    Objective:  System    Physical Exam    General     ROS    Assessment/Plan:    DISCHARGE REPORT    Progress reporting period is from 04/13/2019 to 06/19/2019.   Patient has not returned to therapy so current subjective and objective findings are unknown.  The subjective and objective information are from the last visit (04/20/2019) with this patient.    SUBJECTIVE  Subjective: Patient stated he has pain in L leg (all over; more tingling). pt stated he is doing his HEP.   Current Pain level: 6/10   Initial Pain level: 7/10   Changes in function: No changes noted in function since last SOAP note    ;   ,       OBJECTIVE  Objective: Proprioception:  fair control SLS with EO; Immediate LOB with EC in SLS on L; lots of cueing to stay on task;  added knee bends and proprioception to POC      ASSESSMENT/PLAN  Updated problem list and treatment plan: home program  STG/LTGs have been met or progress has been made towards goals:  N/A  Assessment of Progress: The patient has not returned to therapy. Current status is unknown.  Self Management Plans:  Patient has been instructed in a home treatment program.  Tad continues to require the following intervention to meet STG and LTG's: PT intervention is no longer required to meet STG/LTG.    Recommendations:  Pt last seen in PT 04/20/2019.  He has since no showed and not responded to phone message.  Discharge to [facility].

## 2020-02-25 ENCOUNTER — OFFICE VISIT (OUTPATIENT)
Dept: FAMILY MEDICINE | Facility: CLINIC | Age: 25
End: 2020-02-25
Payer: COMMERCIAL

## 2020-02-25 VITALS
DIASTOLIC BLOOD PRESSURE: 74 MMHG | OXYGEN SATURATION: 97 % | BODY MASS INDEX: 26.16 KG/M2 | HEIGHT: 67 IN | SYSTOLIC BLOOD PRESSURE: 132 MMHG | WEIGHT: 166.7 LBS | HEART RATE: 80 BPM | TEMPERATURE: 97.3 F

## 2020-02-25 DIAGNOSIS — F12.20 CANNABIS DEPENDENCE (H): ICD-10-CM

## 2020-02-25 DIAGNOSIS — F33.9 RECURRENT MAJOR DEPRESSIVE DISORDER, REMISSION STATUS UNSPECIFIED (H): ICD-10-CM

## 2020-02-25 DIAGNOSIS — F90.1 ATTENTION DEFICIT HYPERACTIVITY DISORDER (ADHD), PREDOMINANTLY HYPERACTIVE TYPE: ICD-10-CM

## 2020-02-25 DIAGNOSIS — F11.20 CONTINUOUS OPIOID DEPENDENCE (H): Primary | ICD-10-CM

## 2020-02-25 PROBLEM — F41.9 ANXIETY: Status: RESOLVED | Noted: 2017-11-27 | Resolved: 2020-02-25

## 2020-02-25 PROCEDURE — 99203 OFFICE O/P NEW LOW 30 MIN: CPT | Performed by: NURSE PRACTITIONER

## 2020-02-25 ASSESSMENT — MIFFLIN-ST. JEOR: SCORE: 1703.65

## 2020-02-25 NOTE — PROGRESS NOTES
"Subjective     Tad Beltre is a 24 year old male who presents to clinic today for the following health issues:    HPI     Patient is here for a referral to Addiction Medicine. Had been attending Suboxone program through [facility], but missed appointment and was placed on the wait schedule, so he typically had to be there for three hours at a time, which caused him to miss work. He is currently attending outpatient addiction treatment through [facility]; there is a suboxone provider at [facility], but he has heard that they typically require weekly appointments and he is hoping to do monthly appointments with an addiction medicine provider.   Mood is basically stable- he feels that current psychiatric medications are working. He is moving to a new apartment in [place], and has a new job changing oil.     New Patient/Transfer of Care  -------------------------------------    Patient Active Problem List   Diagnosis     Opioid type dependence (H)     Cannabis dependence (H)     Non-traumatic rhabdomyolysis     Neuropathy     Moderate single current episode of major depressive disorder (H)     Essential hypertension     Tobacco use disorder     History reviewed. No pertinent surgical history.    Social History     Tobacco Use     Smoking status: Current Every Day Smoker     Packs/day: 1.00     Smokeless tobacco: Never Used   Substance Use Topics     Alcohol use: Yes     Family History   Problem Relation Age of Onset     Anxiety Disorder Mother      Depression Mother      Depression Maternal Grandmother            -------------------------------------  Reviewed and updated as needed this visit by Provider         Review of Systems   ROS COMP: Constitutional, HEENT, cardiovascular, pulmonary, gi and gu systems are negative, except as otherwise noted.      Objective    /74   Pulse 80   Temp 97.3  F (36.3  C) (Oral)   Ht 1.7 m (5' 6.93\")   Wt 75.6 kg (166 lb 11.2 oz)   SpO2 97%   " BMI 26.16 kg/m    Body mass index is 26.16 kg/m .  Physical Exam   GENERAL: healthy, alert and no distress  RESP: lungs clear to auscultation - no rales, rhonchi or wheezes  CV: regular rate and rhythm, normal S1 S2, no S3 or S4, no murmur, click or rub, no peripheral edema and peripheral pulses strong  ABDOMEN: soft, nontender, no hepatosplenomegaly, no masses and bowel sounds normal  MS: no gross musculoskeletal defects noted, no edema  PSYCH: mentation appears normal, affect normal/bright and affect flat    Diagnostic Test Results:  Labs reviewed in Epic  No results found for this or any previous visit (from the past 24 hour(s)).        Assessment & Plan   Problem List Items Addressed This Visit     Cannabis dependence (H)      Other Visit Diagnoses     Continuous opioid dependence (H)    -  Primary    Relevant Orders    MENTAL HEALTH REFERRAL  - Adult; Addiction Medicine Provider, Outpatient Treatment;  / CD Assessment Center - Assess Level of Care Needed & Treat; Mental Health Evaluation - determine appropriate level of care and admit to program; [facility]ce...    Recurrent major depressive disorder, remission status unspecified (H)        Relevant Orders    MENTAL HEALTH REFERRAL  - Adult; Addiction Medicine Provider, Outpatient Treatment; MH / CD Assessment Center - Assess Level of Care Needed & Treat; Mental Health Evaluation - determine appropriate level of care and admit to program; [facility]...    Attention deficit hyperactivity disorder (ADHD), predominantly hyperactive type        Relevant Orders    MENTAL HEALTH REFERRAL  - Adult; Addiction Medicine Provider, Outpatient Treatment;  / CD Assessment Center - Assess Level of Care Needed & Treat; Mental Health Evaluation - determine appropriate level of care and admit to program; [facility]...       REferral placed to addiction medicine; Discussed it may be quicker for him to pursue suboxone therapy through [facility], since he is already there  "several times per week, and he lee ann check into it.      Tobacco Cessation:   reports that he has been smoking. He has been smoking about 1.00 pack per day. He has never used smokeless tobacco.        BMI:   Estimated body mass index is 26.16 kg/m  as calculated from the following:    Height as of this encounter: 1.7 m (5' 6.93\").    Weight as of this encounter: 75.6 kg (166 lb 11.2 oz).           See Patient Instructions  No follow-ups on file.    [facility]      "

## 2020-12-10 ENCOUNTER — HOSPITAL ENCOUNTER (EMERGENCY)
Facility: CLINIC | Age: 25
Discharge: HOME | End: 2020-12-11
Attending: EMERGENCY MEDICINE | Admitting: EMERGENCY MEDICINE
Payer: COMMERCIAL

## 2020-12-10 DIAGNOSIS — R41.0 DELIRIUM: ICD-10-CM

## 2020-12-10 PROCEDURE — 93005 ELECTROCARDIOGRAM TRACING: CPT

## 2020-12-10 PROCEDURE — 99285 EMERGENCY DEPT VISIT HI MDM: CPT | Mod: 25

## 2020-12-10 RX ORDER — OLANZAPINE 10 MG/2ML
10 INJECTION, POWDER, FOR SOLUTION INTRAMUSCULAR ONCE
Status: COMPLETED | OUTPATIENT
Start: 2020-12-10 | End: 2020-12-11

## 2020-12-10 RX ORDER — LORAZEPAM 2 MG/ML
2 INJECTION INTRAMUSCULAR ONCE
Status: COMPLETED | OUTPATIENT
Start: 2020-12-10 | End: 2020-12-11

## 2020-12-10 NOTE — ED AVS SNAPSHOT
[facility] Emergency Dept  [address]  [facility] [address]  Phone: [phone]  Fax: [phone]                                    Tad Beltre   MRN: [id number]    Department: [facility] Emergency Dept   Date of Visit: 12/10/2020           After Visit Summary Signature Page    I have received my discharge instructions, and my questions have been answered. I have discussed any challenges I see with this plan with the nurse or doctor.    ..........................................................................................................................................  Patient/Patient Representative Signature      ..........................................................................................................................................  Patient Representative Print Name and Relationship to Patient    ..................................................               ................................................  Date                                   Time    ..........................................................................................................................................  Reviewed by Signature/Title    ...................................................              ..............................................  Date                                               Time          22EPIC Rev 08/18

## 2020-12-11 VITALS
OXYGEN SATURATION: 100 % | TEMPERATURE: 96.6 F | SYSTOLIC BLOOD PRESSURE: 128 MMHG | DIASTOLIC BLOOD PRESSURE: 78 MMHG | RESPIRATION RATE: 14 BRPM | HEART RATE: 68 BPM

## 2020-12-11 LAB
AMPHETAMINES UR QL SCN: NEGATIVE
ANION GAP SERPL CALCULATED.3IONS-SCNC: 2 MMOL/L (ref 3–14)
APAP SERPL-MCNC: <2 MG/L (ref 10–20)
BARBITURATES UR QL: NEGATIVE
BASOPHILS # BLD AUTO: 0 10E9/L (ref 0–0.2)
BASOPHILS NFR BLD AUTO: 0.3 %
BENZODIAZ UR QL: POSITIVE
BUN SERPL-MCNC: 21 MG/DL (ref 7–30)
CALCIUM SERPL-MCNC: 9 MG/DL (ref 8.5–10.1)
CANNABINOIDS UR QL SCN: POSITIVE
CHLORIDE SERPL-SCNC: 108 MMOL/L (ref 94–109)
CK SERPL-CCNC: 336 U/L (ref 30–300)
CO2 SERPL-SCNC: 30 MMOL/L (ref 20–32)
COCAINE UR QL: POSITIVE
CREAT SERPL-MCNC: 0.84 MG/DL (ref 0.66–1.25)
DIFFERENTIAL METHOD BLD: ABNORMAL
EOSINOPHIL # BLD AUTO: 0 10E9/L (ref 0–0.7)
EOSINOPHIL NFR BLD AUTO: 0.6 %
ERYTHROCYTE [DISTWIDTH] IN BLOOD BY AUTOMATED COUNT: 11.9 % (ref 10–15)
ETHANOL SERPL-MCNC: <0.01 G/DL
GFR SERPL CREATININE-BSD FRML MDRD: >90 ML/MIN/{1.73_M2}
GLUCOSE SERPL-MCNC: 92 MG/DL (ref 70–99)
HCT VFR BLD AUTO: 41 % (ref 40–53)
HGB BLD-MCNC: 13.8 G/DL (ref 13.3–17.7)
IMM GRANULOCYTES # BLD: 0 10E9/L (ref 0–0.4)
IMM GRANULOCYTES NFR BLD: 0.4 %
INTERPRETATION ECG - MUSE: NORMAL
LYMPHOCYTES # BLD AUTO: 2.3 10E9/L (ref 0.8–5.3)
LYMPHOCYTES NFR BLD AUTO: 32.8 %
MCH RBC QN AUTO: 31.7 PG (ref 26.5–33)
MCHC RBC AUTO-ENTMCNC: 33.7 G/DL (ref 31.5–36.5)
MCV RBC AUTO: 94 FL (ref 78–100)
MONOCYTES # BLD AUTO: 0.5 10E9/L (ref 0–1.3)
MONOCYTES NFR BLD AUTO: 7 %
NEUTROPHILS # BLD AUTO: 4.2 10E9/L (ref 1.6–8.3)
NEUTROPHILS NFR BLD AUTO: 58.9 %
NRBC # BLD AUTO: 0 10*3/UL
NRBC BLD AUTO-RTO: 0 /100
OPIATES UR QL SCN: NEGATIVE
PCP UR QL SCN: NEGATIVE
PLATELET # BLD AUTO: 194 10E9/L (ref 150–450)
POTASSIUM SERPL-SCNC: 3.6 MMOL/L (ref 3.4–5.3)
RBC # BLD AUTO: 4.36 10E12/L (ref 4.4–5.9)
SODIUM SERPL-SCNC: 140 MMOL/L (ref 133–144)
WBC # BLD AUTO: 7.1 10E9/L (ref 4–11)

## 2020-12-11 PROCEDURE — 80048 BASIC METABOLIC PNL TOTAL CA: CPT | Performed by: EMERGENCY MEDICINE

## 2020-12-11 PROCEDURE — 80320 DRUG SCREEN QUANTALCOHOLS: CPT | Performed by: EMERGENCY MEDICINE

## 2020-12-11 PROCEDURE — 90791 PSYCH DIAGNOSTIC EVALUATION: CPT

## 2020-12-11 PROCEDURE — 80329 ANALGESICS NON-OPIOID 1 OR 2: CPT | Performed by: EMERGENCY MEDICINE

## 2020-12-11 PROCEDURE — 250N000009 HC RX 250

## 2020-12-11 PROCEDURE — 80307 DRUG TEST PRSMV CHEM ANLYZR: CPT | Performed by: EMERGENCY MEDICINE

## 2020-12-11 PROCEDURE — 96372 THER/PROPH/DIAG INJ SC/IM: CPT | Performed by: EMERGENCY MEDICINE

## 2020-12-11 PROCEDURE — 36415 COLL VENOUS BLD VENIPUNCTURE: CPT | Performed by: EMERGENCY MEDICINE

## 2020-12-11 PROCEDURE — 82550 ASSAY OF CK (CPK): CPT | Performed by: EMERGENCY MEDICINE

## 2020-12-11 PROCEDURE — 250N000011 HC RX IP 250 OP 636: Performed by: EMERGENCY MEDICINE

## 2020-12-11 PROCEDURE — 85025 COMPLETE CBC W/AUTO DIFF WBC: CPT | Performed by: EMERGENCY MEDICINE

## 2020-12-11 RX ORDER — OLANZAPINE 10 MG/2ML
10 INJECTION, POWDER, FOR SOLUTION INTRAMUSCULAR DAILY PRN
Status: DISCONTINUED | OUTPATIENT
Start: 2020-12-11 | End: 2020-12-11 | Stop reason: HOSPADM

## 2020-12-11 RX ORDER — KETAMINE HYDROCHLORIDE 100 MG/ML
INJECTION, SOLUTION INTRAMUSCULAR; INTRAVENOUS
Status: COMPLETED
Start: 2020-12-11 | End: 2020-12-11

## 2020-12-11 RX ORDER — OLANZAPINE 10 MG/2ML
INJECTION, POWDER, FOR SOLUTION INTRAMUSCULAR
Status: DISCONTINUED
Start: 2020-12-11 | End: 2020-12-11 | Stop reason: HOSPADM

## 2020-12-11 RX ADMIN — KETAMINE HYDROCHLORIDE 200 MG: 100 INJECTION, SOLUTION, CONCENTRATE INTRAMUSCULAR; INTRAVENOUS at 00:14

## 2020-12-11 RX ADMIN — OLANZAPINE 10 MG: 10 INJECTION, POWDER, FOR SOLUTION INTRAMUSCULAR at 00:05

## 2020-12-11 RX ADMIN — LORAZEPAM 2 MG: 2 INJECTION INTRAMUSCULAR; INTRAVENOUS at 00:05

## 2020-12-11 NOTE — ED PROVIDER NOTES
4:00PM  I received patient in signout from Dr. Villa.  Please refer to their complete H&P for further information.  Briefly, patient presented in restraint with altered mental status.  He was given IM versed en route and received IM ketamine in the ED given persistent agitation. DEC eval was attempted during last shift though patient was reportedly not cooperating and refusing to speak to DEC.  At time of signout, DEC eval pending.     4:11 PM  I evaluated the patient.  He is clinically sober.  He ambulated with steady gait.  He is tolerating PO at bedside.  Patient agreeable to speak to DEC at this time.  Likely drug induced delirium.      1640 I spoke to DEC regarding this patient.  He was cooperative on reevaluation.  Patient does not require inpatient placement at this time.  Patient denies any suicidal/homicidal ideations or response to internal stimuli.  He was provided chemical dependency resources.  I counseled patient extensively on my recommendation to cease using drugs.  Patient was discharged home in stable condition.  Return precautions given.    6:17 PM  Patient was being discharged and was then refusing to leave as we were not able to provide him money for an Uber.  I offered patient numerous options including bus tokens though he is declining.  He was verbally aggressive with myself as well as staff.  He is not psychotic appearing and I do not feel he requires hospitalization at this time.  He was escorted out by security.      Fara Dominguez, DO  12/10/2020   [facility] EMERGENCY DEPT         Fara Dominguez, DO  12/11/20 7714

## 2020-12-11 NOTE — ED NOTES
Heart rate consistently at 45-48/min. Patient is asleep. Dr. Peres notified. Will continue to monitor  B/P: 108/63, P: 46, R: 13 SpO2 100% on room air

## 2020-12-11 NOTE — ED NOTES
In an attempt to get 12 EKG patient was woken up, became restless. Mumbling words. Attempted to help patient use urinal. Unable to re orient patient. Patient did fall back asleep at this time
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
## 2020-12-11 NOTE — ED NOTES
"Pt woke while 72 hour rights read to him.  He states \"I can't be here.  I have things to do today\".     "

## 2020-12-11 NOTE — ED NOTES
"Pt removed monitoring equipment and got out of bed with an unsteady gait.  He is yelling at security staff and stating \"I have to get to work.  I don't have time for this bullshit\".  Pt physically aggressive toward security staff and yelling loudly.  MD in to discuss plan of care and he is unreceptive to DEC evaluation.  Pt informed that his choices are to wait for DEC evaluation or have IM medication for his agitation.  He initially agreed to the evaluation and then began yelling at the secuirty staff again.  Pt is called out on his disruptive behavior and offered final option to cooperate.  He voided into urinal and then got into bed covering his head with a blanket.   "

## 2020-12-11 NOTE — ED NOTES
Patient woke up. Vomited yellow, bile vomit all over floor. Asked to pee, able to use urinal in bed.  Urine sent
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
## 2020-12-11 NOTE — ED NOTES
Bed: ED06  Expected date:   Expected time:   Means of arrival:   Comments:  A535 - mid 20s agitated, restraints

## 2020-12-11 NOTE — ED NOTES
Patient sleeping. Non labored respirations. Cardiac and pulse monitor placed. Continue violent restraints at this time

## 2020-12-11 NOTE — ED NOTES
"Pt introduced to DEC evaluator.  He states that he cannot see the evaluator after computer positioned.  He then complained of the sound of the evaluator's voice because of \"echo\".  "
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
## 2020-12-11 NOTE — ED NOTES
Pt has been laying quietly on stretcher since the DEC evaluation completed.  No aggressive behavior since the evaluation completed.

## 2020-12-12 NOTE — ED NOTES
Writer brought patient his belongings which were locked in a locker, patient was explained his discharge instructions.  Patient began yelling about needing a ride, writer asked if patient had a phone with him or if there was someone that could be called to pick him up.  Patient began getting dressed and writer left room.  Patient than came out into [facility]rera and started yelling about needing a ride and that his wallet was missing.  Patient continued to yell and state it was the hospital's responsibility to get him a cab and pay for it or that the staff could give him cash and he would call his own cab.  Security called to room and again it was explained that the hospital could give the patient bus tokens and assist him with finding the appropriate bus to get him home, patient stated that wasn't going to work for him and he wanted a taxi or cash.  Patient continued with same behavior and was escorted out by security.